# Patient Record
Sex: MALE | Race: AMERICAN INDIAN OR ALASKA NATIVE
[De-identification: names, ages, dates, MRNs, and addresses within clinical notes are randomized per-mention and may not be internally consistent; named-entity substitution may affect disease eponyms.]

---

## 2019-11-11 ENCOUNTER — HOSPITAL ENCOUNTER (EMERGENCY)
Dept: HOSPITAL 5 - ED | Age: 39
Discharge: HOME | End: 2019-11-11
Payer: SELF-PAY

## 2019-11-11 VITALS — DIASTOLIC BLOOD PRESSURE: 84 MMHG | SYSTOLIC BLOOD PRESSURE: 134 MMHG

## 2019-11-11 DIAGNOSIS — M79.642: Primary | ICD-10-CM

## 2019-11-11 DIAGNOSIS — Z98.890: ICD-10-CM
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
## 2019-11-11 DIAGNOSIS — E11.9: ICD-10-CM

## 2019-11-11 DIAGNOSIS — I10: ICD-10-CM
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
## 2019-11-11 DIAGNOSIS — M79.641: ICD-10-CM

## 2019-11-11 DIAGNOSIS — Z79.899: ICD-10-CM

## 2019-11-11 DIAGNOSIS — J45.909: ICD-10-CM

## 2019-11-11 PROCEDURE — 72125 CT NECK SPINE W/O DYE: CPT

## 2019-11-11 PROCEDURE — 70450 CT HEAD/BRAIN W/O DYE: CPT

## 2019-11-11 NOTE — EMERGENCY DEPARTMENT REPORT
HPI





- General


Chief Complaint: Pain General


Time Seen by Provider: 11/11/19 12:07





- HPI


HPI: 





This is a 39-year-old male who appears to be homeless presents to the ED 

complaining of generalized body aches with bilateral hand pain.  Patient states 

that around done yesterday he was helping someone move her refrigerator when the

refrigerator accidentally hit his hand does have a small.  Patient denies any 

crush injury to the hand or head.  Patient states that he's been having a 

headache and bilateral hand pains incident yesterday.  He denies loss of 

consciousness, blurred vision, nausea vomiting





ED Past Medical Hx





- Past Medical History


Previous Medical History?: Yes


Hx Hypertension: Yes


Hx Diabetes: Yes


Hx Asthma: Yes


Additional medical history: heart murmur





- Surgical History


Past Surgical History?: Yes


Additional Surgical History: heart surgery--infant





- Social History


Smoking Status: Never Smoker


Substance Use Type: None





- Medications


Home Medications: 


                                Home Medications











 Medication  Instructions  Recorded  Confirmed  Last Taken  Type


 


ALBUTEROL Inhaler (OR & NICU) 1 puff IH Q4-6H #1 inha 11/07/13  Unknown Rx





[ProAir HFA Inhaler]     


 


Albuterol Sulfate [Albuterol 0.63%] 0.63 mg IH TID PRN 11/07/13 11/07/13 11/01/13 History


 


Prednisone 1 tab PO BID #8 tablet 11/07/13  Unknown Rx


 


Promethazine [Phenergan] 25 mg PO Q6H PRN #12 tablet 02/11/14  Unknown Rx


 


Ibuprofen [Motrin] 600 mg PO Q8H PRN #30 tablet 11/11/19  Unknown Rx














ED Review of Systems


ROS: 


Stated complaint: ARMS/HEAD/BACK PAIN


Other details as noted in HPI





Comment: All other systems reviewed and negative





Physical Exam





- Physical Exam


Vital Signs: 


                                   Vital Signs











  11/11/19





  12:06


 


Temperature 98.2 F


 


Pulse Rate 51 L


 


Respiratory 22





Rate 


 


Blood Pressure 134/84


 


O2 Sat by Pulse 99





Oximetry 











Physical Exam: 





GENERAL: Alert and oriented x3, no apparent distress, Normal Gait, atraumatic.  

Sleeping comfortably in the ED bed.  With bags of belongings


HEAD: Head is normocephalic and a-traumatic.


EYES: Extra ocular muscles are intact. Pupils are equal, round, and reactive to 

light and accommodation.


EARS: symetrical, atraumatic, non tender, ear canal clear and moderate cerumen, 

tympanic membrance non inflamed. gross auditory nml bilaterally. 


NOSE: Nose symetrical, Nontender,Nares appeared normal.


MOUTH:Mouth is well hydrated and without lesions. Tonsils nonerythematous or 

swollen,  Uvula midline, Tongue not elevated. Mucous membranes are moist. 

Posterior pharynx clear, no exudate or lesions. Patent airways.


NECK: Supple. Non edematous, No carotid bruits.  No lymphadenopathy or 

thyromegaly.  No C-spine tenderness


EXTREMITIES/MUSCULOSKELETAL: No cyanosis, clubbing, rash, lesions or edema. Full

 ROM bilaterally. UE/LE Pulses 2+ bilaterally.  LE and UE 5+ strength 

bilaterally, straight leg raise negative bilaterally


NEUROLOGIC:  The patient is cooperative with no focal neurologic deficits. 

Cranial nerves II through XII are grossly intact. Normal speech.  Normal 

sensation in bilateral upper and lower extremities,  No loss of sensation,  No 

facial droop, Negative rhomberg.





SKIN:  Warm and dry, No lesions, No ulceration or induration present.











ED Course


                                   Vital Signs











  11/11/19





  12:06


 


Temperature 98.2 F


 


Pulse Rate 51 L


 


Respiratory 22





Rate 


 


Blood Pressure 134/84


 


O2 Sat by Pulse 99





Oximetry 














ED Medical Decision Making





- Radiology Data


Radiology results: report reviewed, image reviewed


CT head/brain wo con





INDICATION / CLINICAL INFORMATION:


39 years Male; headache w/ loc s/p trauma.





TECHNIQUE: Routine CT head without contrast. All CT scans at this location are 

performed using CT


dose reduction for ALARA by means of automated exposure control.





COMPARISON:


None.





FINDINGS:





BRAIN / INTRACRANIAL CONTENTS: No acute hemorrhage, mass effect, midline shift, 

hydrocephalus, or


acute, large territorial infarct. No chronic infarct or atrophy appreciated. No 

significant white


matter abnormality.





CRANIOCERVICAL JUNCTION: No significant abnormality.





ORBITS: No significant abnormality of visualized orbits.


SINUSES / MASTOIDS: No significant abnormality the visualized paranasal sinuses 

or mastoid air


cells.





ADDITIONAL FINDINGS: None.





IMPRESSION:


1. No focal mass, hemorrhage, hydrocephalus, or acute, large territorial 

infarct.





Signer Name: Jesse Jara MD, III


Signed: 11/11/2019 12:51 PM


Workstation Name: [id number]








Transcribed By: HR


Dictated By: Jesse Jara MD


Electronically Authenticated By: Jesse Jara MD


Signed Date/Time: 11/11/19 1251





FINDINGS:





POST-SURGICAL CHANGES: None.





ALIGNMENT: Normal cervical lordosis seen without significant scoliosis.


VERTEBRAE: No signs of fracture. Vertebral bodies are grossly normal in height 

throughout.





There is mild osseous foraminal narrowing on the right at C3-4 and C4-5 from 

uncinate hypertrophy.


Similar findings on the left at C4-5.





INTRAVERTEBRAL DISCS: Disc space narrowing seen at C3-4 and C4-5, as well as C5-

6. Mild disc


disease seen at various levels. A prominent finding is at C4-5, where there is a

 posterocentral disc


protrusion, which certainly could encroach upon the cervical cord. Similar 

findings to lesser degree


seen along the right paracentral margin of the disc at C3-4. Finally, there is a

 moderately sized


posterocentral disc extrusion at C5-6 which may flatten the cervical cord. MRI 

may be helpful for


further evaluation, if clinically warranted.





PARASPINAL SOFT TISSUES: No significant abnormality.





ADDITIONAL FINDINGS: None.





IMPRESSION:


1. No signs of acute bony trauma to the cervical spine.


2. Disc disease from C3-4 through C5-6, as described above. Follow-up as 

clinically warranted.





Signer Name: Jesse Jara MD, III


Signed: 11/11/2019 1:18 PM


Workstation Name: [id number]








Transcribed By: HR


Dictated By: Jesse Jara MD


Electronically Authenticated By: Jesse Jara MD


Signed Date/Time: 11/11/19 131





- Medical Decision Making


39-year-old male presents to ED with paresthesias of bilateral hand, 


ED course: CT scan of the head and cervical was ordered.


Vital signs are normal patient is in no acute distress.  He is speaking in clear

 sentences and able to understand structures given in English


Discussed with patient follow-up with primary care physician.  


Discussed the patient and take medications as prescribed. 


Patient has no neurological deficit.  Patient is alert and oriented 3  and 

understands all instructions given.


 Discussed  drowsiness effect of Flexeril makes her drowsy and not to operate 

machinery while taking flexeril





Critical care attestation.: 


If time is entered above; I have spent that time in minutes in the direct care 

of this critically ill patient, excluding procedure time.








ED Disposition


Clinical Impression: 


 Bilateral hand pain, Myalgia





Disposition: DC-01 TO HOME OR SELFCARE


Is pt being admited?: No


Does the pt Need Aspirin: No


Condition: Stable


Instructions:  Trigger Point Pain (ED), Musculoskeletal Pain (ED)


Additional Instructions: 


Make sure to follow up with the primary care physician as discussed.


Take all your medications as you've been prescribed.


If you have any worsening symptoms or develop new symptoms please return to ED 

immediately.


Prescriptions: 


Ibuprofen [Motrin] 600 mg PO Q8H PRN #30 tablet


 PRN Reason: Pain


Referrals: 


PRIMARY CARE,MD [Primary Care Provider] - 3-5 Days


[facility] [Outside] - 3-5 Days


[facility] [Outside] - 3-5 Days


Forms:  Work/School Release Form(ED)


Time of Disposition: 13:52

## 2019-11-11 NOTE — CAT SCAN REPORT
CT head/brain wo con



INDICATION / CLINICAL INFORMATION:

39 years Male; headache w/ loc s/p trauma.



TECHNIQUE: Routine CT head without contrast. All CT scans at this location are performed using CT dos
e reduction for ALARA by means of automated exposure control.



COMPARISON: 

None.



FINDINGS:



BRAIN / INTRACRANIAL CONTENTS: No acute hemorrhage, mass effect, midline shift, hydrocephalus, or acu
te, large territorial infarct. No chronic infarct or atrophy appreciated. No significant white matter
 abnormality.



CRANIOCERVICAL JUNCTION: No significant abnormality.



ORBITS: No significant abnormality of visualized orbits.

SINUSES / MASTOIDS: No significant abnormality the visualized paranasal sinuses or mastoid air cells.




ADDITIONAL FINDINGS: None. 



IMPRESSION:

1. No focal mass, hemorrhage, hydrocephalus, or acute, large territorial infarct. 



Signer Name: Jesse Jara MD, III 

Signed: 11/11/2019 12:51 PM

 Workstation Name: [id number]

## 2019-11-11 NOTE — XRAY REPORT
BILATERAL HANDS 6 VIEWS



INDICATION / CLINICAL INFORMATION:

Bilateral hand pain after fall on outstretched hands.



COMPARISON:

None available.

 

FINDINGS:



BONES and JOINT(S): No acute fracture or subluxation. No significant arthritis.

SOFT TISSUES: No significant abnormality.



ADDITIONAL FINDINGS: None.



IMPRESSION:

No significant abnormality of the hands.



Signer Name: Nic Rogers MD 

Signed: 11/11/2019 1:06 PM

 Workstation Name: [id number]

## 2019-11-11 NOTE — EMERGENCY DEPARTMENT REPORT
Blank Doc





- Documentation


Documentation: 





39-year-old male that presents with headache and bilateral hand pains after freg

hit his hands and head.  Stated has LOC.





This initial assessment/diagnostic orders/clinical plan/treatment(s) is/are 

subject to change based on patient's health status, clinical progression and re-

assessment by fellow clinical providers in the ED.  Further treatment and workup

at subsequent clinical providers discretion.  Patient/guardians urged not to 

elope from the ED as their condition may be serious if not clinically assessed 

and managed.  Initial orders include:


1- Patient sent to [facility] for further evaluation and treatment


2- CT head/neck


3- xrays of hands


4- cervical collar

## 2019-11-11 NOTE — CAT SCAN REPORT
CT cervical spine wo con



INDICATION / CLINICAL INFORMATION:

39 years Male; headache w/ loc s/p trauma.



TECHNIQUE:

Axial CT images of the cervical spine were obtained.  Sagittal and coronal reformatted images were pr
oduced. All CT scans at this location are performed using CT dose reduction for ALARA by means of aut
omated exposure control.



COMPARISON: 

None available.



FINDINGS:



POST-SURGICAL CHANGES: None.



ALIGNMENT: Normal cervical lordosis seen without significant scoliosis.

VERTEBRAE: No signs of fracture. Vertebral bodies are grossly normal in height throughout.  



There is mild osseous foraminal narrowing on the right at C3-4 and C4-5 from uncinate hypertrophy. Si
milar findings on the left at C4-5.



INTRAVERTEBRAL DISCS: Disc space narrowing seen at C3-4 and C4-5, as well as C5-6. Mild disc disease 
seen at various levels. A prominent finding is at C4-5, where there is a posterocentral disc protrusi
on, which certainly could encroach upon the cervical cord. Similar findings to lesser degree seen candace
ng the right paracentral margin of the disc at C3-4. Finally, there is a moderately sized posterocent
ral disc extrusion at C5-6 which may flatten the cervical cord. MRI may be helpful for further evalua
tion, if clinically warranted.



PARASPINAL SOFT TISSUES: No significant abnormality.



ADDITIONAL FINDINGS: None.



IMPRESSION:

1. No signs of acute bony trauma to the cervical spine.  

2. Disc disease from C3-4 through C5-6, as described above. Follow-up as clinically warranted.



Signer Name: Jesse Jara MD, III 

Signed: 11/11/2019 1:18 PM

 Workstation Name: [id number]

## 2022-08-03 ENCOUNTER — HOSPITAL ENCOUNTER (EMERGENCY)
Dept: HOSPITAL 5 - ED | Age: 42
LOS: 1 days | Discharge: HOME | End: 2022-08-04
Payer: SELF-PAY

## 2022-08-03 DIAGNOSIS — Z20.822: ICD-10-CM

## 2022-08-03 DIAGNOSIS — E11.9: ICD-10-CM

## 2022-08-03 DIAGNOSIS — I10: ICD-10-CM

## 2022-08-03 DIAGNOSIS — R45.851: Primary | ICD-10-CM

## 2022-08-03 DIAGNOSIS — J45.909: ICD-10-CM

## 2022-08-03 LAB
BASOPHILS # (AUTO): 0 K/MM3 (ref 0–0.1)
BASOPHILS NFR BLD AUTO: 0.8 % (ref 0–1.8)
BUN SERPL-MCNC: 19 MG/DL (ref 9–20)
BUN/CREAT SERPL: 24 %
CALCIUM SERPL-MCNC: 8.5 MG/DL (ref 8.4–10.2)
EOSINOPHIL # BLD AUTO: 0.2 K/MM3 (ref 0–0.4)
EOSINOPHIL NFR BLD AUTO: 5.1 % (ref 0–4.3)
HCT VFR BLD CALC: 40 % (ref 35.5–45.6)
HEMOLYSIS INDEX: 42
HGB BLD-MCNC: 13.2 GM/DL (ref 11.8–15.2)
LYMPHOCYTES # BLD AUTO: 1 K/MM3 (ref 1.2–5.4)
LYMPHOCYTES NFR BLD AUTO: 25.2 % (ref 13.4–35)
MCHC RBC AUTO-ENTMCNC: 33 % (ref 32–34)
MCV RBC AUTO: 93 FL (ref 84–94)
MONOCYTES # (AUTO): 0.4 K/MM3 (ref 0–0.8)
MONOCYTES % (AUTO): 9.6 % (ref 0–7.3)
PLATELET # BLD: 257 K/MM3 (ref 140–440)
RBC # BLD AUTO: 4.29 M/MM3 (ref 3.65–5.03)

## 2022-08-03 PROCEDURE — 36415 COLL VENOUS BLD VENIPUNCTURE: CPT

## 2022-08-03 PROCEDURE — 85025 COMPLETE CBC W/AUTO DIFF WBC: CPT

## 2022-08-03 PROCEDURE — G0480 DRUG TEST DEF 1-7 CLASSES: HCPCS

## 2022-08-03 PROCEDURE — U0003 INFECTIOUS AGENT DETECTION BY NUCLEIC ACID (DNA OR RNA); SEVERE ACUTE RESPIRATORY SYNDROME CORONAVIRUS 2 (SARS-COV-2) (CORONAVIRUS DISEASE [COVID-19]), AMPLIFIED PROBE TECHNIQUE, MAKING USE OF HIGH THROUGHPUT TECHNOLOGIES AS DESCRIBED BY CMS-2020-01-R: HCPCS

## 2022-08-03 PROCEDURE — 80320 DRUG SCREEN QUANTALCOHOLS: CPT

## 2022-08-03 PROCEDURE — 99284 EMERGENCY DEPT VISIT MOD MDM: CPT

## 2022-08-03 PROCEDURE — 80048 BASIC METABOLIC PNL TOTAL CA: CPT

## 2022-08-03 NOTE — EMERGENCY DEPARTMENT REPORT
ED General Adult HPI





- General


Chief complaint: Psych


Stated complaint: MH/WANTS TO CUT HIMSELF/WEAKNESS


Time Seen by Provider: 08/03/22 13:44


Source: patient, EMS


Mode of arrival: Stretcher


Limitations: No Limitations





- History of Present Illness


Initial comments: 





Who presents with suicidal ideation patient states that he is tired of living 

and that he wants to kill himself.  His plan is to slit his wrists.  He states 

that he has been suicidal before.  Patient currently denies hearing any voices 

further history is limited due to the patient's condition.





- Related Data


                                Home Medications











 Medication  Instructions  Recorded  Confirmed  Last Taken


 


Albuterol Sulfate [Albuterol 0.63%] 0.63 mg IH TID PRN 11/07/13 11/07/13 11/01/13








                                  Previous Rx's











 Medication  Instructions  Recorded  Last Taken  Type


 


Albuterol Mdi (or & Nicu Only) 1 puff IH Q4-6H #1 inha 11/07/13 Unknown Rx





[ProAir HFA Inhaler]    


 


Prednisone 1 tab PO BID #8 tablet 11/07/13 Unknown Rx


 


Promethazine [Phenergan] 25 mg PO Q6H PRN #12 tablet 02/11/14 Unknown Rx


 


Ibuprofen [Motrin] 600 mg PO Q8H PRN #30 tablet 11/11/19 Unknown Rx











                                    Allergies











Allergy/AdvReac Type Severity Reaction Status Date / Time


 


No Known Allergies Allergy   Verified 08/03/22 13:06














ED Review of Systems


ROS: 


Stated complaint: MH/WANTS TO CUT HIMSELF/WEAKNESS


Other details as noted in HPI





Constitutional: denies: chills, fever


Eyes: denies: eye pain, eye discharge, vision change


ENT: denies: ear pain, throat pain


Respiratory: denies: cough, shortness of breath, wheezing


Cardiovascular: denies: chest pain, palpitations


Endocrine: no symptoms reported


Gastrointestinal: denies: abdominal pain, nausea, diarrhea


Genitourinary: denies: urgency, dysuria


Musculoskeletal: denies: back pain, joint swelling, arthralgia


Skin: denies: rash, lesions


Neurological: denies: headache, weakness, paresthesias


Psychiatric: denies: anxiety, depression


Hematological/Lymphatic: denies: easy bleeding, easy bruising





ED Past Medical Hx





- Past Medical History


Hx Hypertension: Yes


Hx Diabetes: Yes


Hx Asthma: Yes


Additional medical history: heart murmur





- Surgical History


Additional Surgical History: heart surgery--infant





- Social History


Smoking Status: Never Smoker


Substance Use Type: None





- Medications


Home Medications: 


                                Home Medications











 Medication  Instructions  Recorded  Confirmed  Last Taken  Type


 


Albuterol Mdi (or & Nicu Only) 1 puff IH Q4-6H #1 inha 11/07/13  Unknown Rx





[ProAir HFA Inhaler]     


 


Albuterol Sulfate [Albuterol 0.63%] 0.63 mg IH TID PRN 11/07/13 11/07/13 11/01/13 History


 


Prednisone 1 tab PO BID #8 tablet 11/07/13  Unknown Rx


 


Promethazine [Phenergan] 25 mg PO Q6H PRN #12 tablet 02/11/14  Unknown Rx


 


Ibuprofen [Motrin] 600 mg PO Q8H PRN #30 tablet 11/11/19  Unknown Rx














ED Physical Exam





- General


Limitations: No Limitations


General appearance: alert, in no apparent distress





- Head


Head exam: Present: atraumatic, normocephalic





- Eye


Eye exam: Present: normal appearance





- ENT


ENT exam: Present: mucous membranes moist





- Neck


Neck exam: Present: normal inspection





- Respiratory


Respiratory exam: Present: normal lung sounds bilaterally.  Absent: respiratory 

distress





- Cardiovascular


Cardiovascular Exam: Present: regular rate, normal rhythm.  Absent: systolic 

murmur, diastolic murmur, rubs, gallop





- GI/Abdominal


GI/Abdominal exam: Present: soft, normal bowel sounds





- Rectal


Rectal exam: Present: deferred





- Extremities Exam


Extremities exam: Present: normal inspection





- Back Exam


Back exam: Present: normal inspection





- Neurological Exam


Neurological exam: Present: alert, oriented X3





- Psychiatric


Psychiatric exam: Present: flat affect, suicidal ideation





- Skin


Skin exam: Present: warm, dry, intact, normal color.  Absent: rash





ED Course


                                   Vital Signs











  08/03/22 08/03/22





  13:04 13:57


 


Temperature 98 F 


 


Pulse Rate 106 H 


 


Respiratory 18 





Rate  


 


Blood Pressure 106/72 





[Left]  


 


O2 Sat by Pulse 98 99





Oximetry  














ED Medical Decision Making





- Lab Data


Result diagrams: 


                                 08/03/22 14:53





                                 08/03/22 14:53








                                   Lab Results











  08/03/22 08/03/22 08/03/22 Range/Units





  14:53 14:53 14:53 


 


WBC  4.1 L    (4.5-11.0)  K/mm3


 


RBC  4.29    (3.65-5.03)  M/mm3


 


Hgb  13.2    (11.8-15.2)  gm/dl


 


Hct  40.0    (35.5-45.6)  %


 


MCV  93    (84-94)  fl


 


MCH  31    (28-32)  pg


 


MCHC  33    (32-34)  %


 


RDW  14.8    (13.2-15.2)  %


 


Plt Count  257    (140-440)  K/mm3


 


Lymph % (Auto)  25.2    (13.4-35.0)  %


 


Mono % (Auto)  9.6 H    (0.0-7.3)  %


 


Eos % (Auto)  5.1 H    (0.0-4.3)  %


 


Baso % (Auto)  0.8    (0.0-1.8)  %


 


Lymph # (Auto)  1.0 L    (1.2-5.4)  K/mm3


 


Mono # (Auto)  0.4    (0.0-0.8)  K/mm3


 


Eos # (Auto)  0.2    (0.0-0.4)  K/mm3


 


Baso # (Auto)  0.0    (0.0-0.1)  K/mm3


 


Seg Neutrophils %  59.3    (40.0-70.0)  %


 


Seg Neutrophils #  2.5    (1.8-7.7)  K/mm3


 


Sodium   141   (137-145)  mmol/L


 


Potassium   4.3   (3.6-5.0)  mmol/L


 


Chloride   107.0   ()  mmol/L


 


Carbon Dioxide   27   (22-30)  mmol/L


 


Anion Gap   11   mmol/L


 


BUN   19   (9-20)  mg/dL


 


Creatinine   0.8   (0.8-1.3)  mg/dL


 


Estimated GFR   > 60   ml/min


 


BUN/Creatinine Ratio   24   %


 


Glucose   96   ()  mg/dL


 


Calcium   8.5   (8.4-10.2)  mg/dL


 


Salicylates    < 0.3 L  (2.8-20.0)  mg/dL


 


Acetaminophen     (10.0-30.0)  ug/mL














  08/03/22 Range/Units





  14:53 


 


WBC   (4.5-11.0)  K/mm3


 


RBC   (3.65-5.03)  M/mm3


 


Hgb   (11.8-15.2)  gm/dl


 


Hct   (35.5-45.6)  %


 


MCV   (84-94)  fl


 


MCH   (28-32)  pg


 


MCHC   (32-34)  %


 


RDW   (13.2-15.2)  %


 


Plt Count   (140-440)  K/mm3


 


Lymph % (Auto)   (13.4-35.0)  %


 


Mono % (Auto)   (0.0-7.3)  %


 


Eos % (Auto)   (0.0-4.3)  %


 


Baso % (Auto)   (0.0-1.8)  %


 


Lymph # (Auto)   (1.2-5.4)  K/mm3


 


Mono # (Auto)   (0.0-0.8)  K/mm3


 


Eos # (Auto)   (0.0-0.4)  K/mm3


 


Baso # (Auto)   (0.0-0.1)  K/mm3


 


Seg Neutrophils %   (40.0-70.0)  %


 


Seg Neutrophils #   (1.8-7.7)  K/mm3


 


Sodium   (137-145)  mmol/L


 


Potassium   (3.6-5.0)  mmol/L


 


Chloride   ()  mmol/L


 


Carbon Dioxide   (22-30)  mmol/L


 


Anion Gap   mmol/L


 


BUN   (9-20)  mg/dL


 


Creatinine   (0.8-1.3)  mg/dL


 


Estimated GFR   ml/min


 


BUN/Creatinine Ratio   %


 


Glucose   ()  mg/dL


 


Calcium   (8.4-10.2)  mg/dL


 


Salicylates   (2.8-20.0)  mg/dL


 


Acetaminophen  5.0 L  (10.0-30.0)  ug/mL














- Medical Decision Making


Chief medical diagnosis: Suicidal ideation


Differential medical diagnosis: Substance-induced mood disorder, schizoaffective

disorder





I will sign 1013 I will get psych consult blood work and I will medically clear 

the patient.





Critical care attestation.: 


If time is entered above; I have spent that time in minutes in the direct care 

of this critically ill patient, excluding procedure time.








ED Disposition


Clinical Impression: 


 Suicidal ideation





Disposition: [address] [place] [address]


Is pt being admited?: No


Does the pt Need Aspirin: No


Condition: Stable

## 2022-08-04 VITALS — DIASTOLIC BLOOD PRESSURE: 51 MMHG | SYSTOLIC BLOOD PRESSURE: 97 MMHG

## 2022-08-04 NOTE — CONSULTATION
History of Present Illness





- Reason for Consult


Consult date: 08/04/22


Reason for consult: SI





- History of Present Psychiatric Illness


The patient was seen today. He is a/o x 3. He denies having any psych history. 

The patient says he was feeling dizzy and someone called 911. He says he doesn't

remember why he's here. The patient denies SI/HI. He says "I got somebody to 

love me and I love my family. I'm not gone do none of that." The patient 

endorses sing "crack sometimes." The patient asks can he have resources for drug

rehab. He denies hallucinations. He says "I was in special ed classes the only 

thing wrong with me." 





PAST PSYCHIATRIC HISTORY: 


Diagnoses: Denies


Suicide attempts or Self-harm behavior:  Denies


Prior psychiatric hospitalizations: Denies


Substance Abuse history:  Crack


Previous psychiatric medications tried:  Denies


Outpatient treatment:  Denies





PAST MEDICAL HISTORY: 





Family Psychiatric History: None reported





SOCIAL HISTORY


Marital Status: Single


Living Arrangements: lives with dad


Employment Status: Unemployed


Access to guns/weapons: Denies


Education: 


History of Abuse: Denies


Legal History: 





REVIEW OF SYSTEMS 


Constitutional: Negative for weight loss


ENT: Negative for stridor


Respiratory: Negative for cough or hemoptysis


All other systems reviewed and are negative





MENTAL STATUS


General Appearance and Behavior: age appropriate, good eye contact, cooperative,

anxious


Cooperation: Cooperative


Psychomotor Behavior: within normal limits


Mood: alright


Affect and affective range: Congruent with stated mood, tearful


Thought Process: goal directed


Thought Content: None


Speech: Normal volume and Regular rate and rhythm


Suicidal Ideation: Denies


Homicidal Ideation: Denies HI


Hallucinations: Denies


Impulse Control: intact


Insight and Judgment: Limited


Memory: Limited


Attention: attentive


Orientation: alert and oriented





Assessment


Cocaine Use Disorder with Substance Induced Mood





Treatment Plan 


d/c 1013


No scripts at this time


Medical: per primary


Sitter: Defer to primary


Disposition: Do not recommend acute psychiatric inpatient treatment. The patient

understands that if SI/HI arise he is to seek immediate assistance


He is to abstain from all illicit drug use


The  to further discuss safety plan and give the patient all necessary 

resources including drug rehab


He is to establish and follow up with outpatient psych in 7 to 14 days upon 

discharge


Will sign off. Thanks for this consult. 


Case staffed with Dr. Smith





Medications and Allergies


                                    Allergies











Allergy/AdvReac Type Severity Reaction Status Date / Time


 


No Known Allergies Allergy   Verified 08/03/22 13:06











                                Home Medications











 Medication  Instructions  Recorded  Confirmed  Last Taken  Type


 


Albuterol Mdi (or & Nicu Only) 1 puff IH Q4-6H #1 inha 11/07/13  Unknown Rx





[ProAir HFA Inhaler]     


 


Albuterol Sulfate [Albuterol 0.63%] 0.63 mg IH TID PRN 11/07/13 11/07/13 11/01/13 History


 


Prednisone 1 tab PO BID #8 tablet 11/07/13  Unknown Rx


 


Promethazine [Phenergan] 25 mg PO Q6H PRN #12 tablet 02/11/14  Unknown Rx


 


Ibuprofen [Motrin] 600 mg PO Q8H PRN #30 tablet 11/11/19  Unknown Rx














Mental Status Exam





- Vital signs


                                Last Vital Signs











Temp  98.3 F   08/03/22 19:58


 


Pulse  53 L  08/03/22 19:58


 


Resp  18   08/03/22 19:58


 


BP  115/68   08/03/22 19:58


 


Pulse Ox  100   08/03/22 19:58














Results


Result Diagrams: 


                                 08/03/22 14:53





                                 08/03/22 14:53


                              Abnormal lab results











  08/03/22 08/03/22 08/03/22 Range/Units





  14:53 14:53 14:53 


 


WBC  4.1 L    (4.5-11.0)  K/mm3


 


Mono % (Auto)  9.6 H    (0.0-7.3)  %


 


Eos % (Auto)  5.1 H    (0.0-4.3)  %


 


Lymph # (Auto)  1.0 L    (1.2-5.4)  K/mm3


 


Salicylates   < 0.3 L   (2.8-20.0)  mg/dL


 


Acetaminophen    5.0 L  (10.0-30.0)  ug/mL








All other labs normal.

## 2022-08-12 ENCOUNTER — HOSPITAL ENCOUNTER (EMERGENCY)
Dept: HOSPITAL 5 - ED | Age: 42
Discharge: HOME | End: 2022-08-12
Payer: SELF-PAY

## 2022-08-12 VITALS — SYSTOLIC BLOOD PRESSURE: 136 MMHG | DIASTOLIC BLOOD PRESSURE: 71 MMHG
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
## 2022-08-12 DIAGNOSIS — S09.90XA: ICD-10-CM

## 2022-08-12 DIAGNOSIS — S01.81XA: Primary | ICD-10-CM
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
## 2022-08-12 PROCEDURE — 99284 EMERGENCY DEPT VISIT MOD MDM: CPT

## 2022-08-12 PROCEDURE — 90471 IMMUNIZATION ADMIN: CPT

## 2022-08-12 PROCEDURE — 90715 TDAP VACCINE 7 YRS/> IM: CPT

## 2022-08-12 PROCEDURE — 72125 CT NECK SPINE W/O DYE: CPT

## 2022-08-12 PROCEDURE — 70450 CT HEAD/BRAIN W/O DYE: CPT
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
